# Patient Record
Sex: FEMALE | Race: WHITE | Employment: OTHER | URBAN - METROPOLITAN AREA
[De-identification: names, ages, dates, MRNs, and addresses within clinical notes are randomized per-mention and may not be internally consistent; named-entity substitution may affect disease eponyms.]

---

## 2017-03-17 NOTE — PATIENT DISCUSSION
POAG, OU: INTRAOCULAR PRESSURE IS WITHIN ACCEPTABLE LIMITS. PT INSTRUCTED TO CONTINUE travatan z_ AND RETURN FOR FOLLOW-UP AS SCHEDULED.

## 2017-06-12 NOTE — PATIENT DISCUSSION
DISCUSSED A REFRACTIVE LENS EXCHANGE UNTIL PT IS VISUALLY SIGNIFICANT ENOUGH FOR CATARACT SURGERY. WILL NOT DO LENSX ON PATIENT BECAUSE OF PREVIOUS RK AND LASIK OU. RECOMMEND ORA. DISCUSSED STANDARD AND SYMFONY LENSES.

## 2017-09-19 NOTE — PATIENT DISCUSSION
POAG, OU: INTRAOCULAR PRESSURE IS WITHIN ACCEPTABLE LIMITS. PT INSTRUCTED TO CONTINUE TRAVATAN Z AND RETURN FOR FOLLOW-UP AS SCHEDULED.

## 2017-11-06 ENCOUNTER — NEW PATIENT COMPREHENSIVE (OUTPATIENT)
Dept: URBAN - METROPOLITAN AREA CLINIC 39 | Facility: CLINIC | Age: 77
End: 2017-11-06

## 2017-11-06 DIAGNOSIS — Z96.1: ICD-10-CM

## 2017-11-06 DIAGNOSIS — H26.492: ICD-10-CM

## 2017-11-06 PROCEDURE — 99204 OFFICE O/P NEW MOD 45 MIN: CPT

## 2017-11-06 PROCEDURE — 92015 DETERMINE REFRACTIVE STATE: CPT

## 2017-11-06 PROCEDURE — G8756 NO BP MEASURE DOC: HCPCS

## 2017-11-06 PROCEDURE — 1036F TOBACCO NON-USER: CPT

## 2017-11-06 PROCEDURE — G8427 DOCREV CUR MEDS BY ELIG CLIN: HCPCS

## 2017-11-06 PROCEDURE — 4040F PNEUMOC VAC/ADMIN/RCVD: CPT

## 2017-11-06 ASSESSMENT — VISUAL ACUITY
OD_CC: J2
OS_CC: 20/40+2
OD_SC: J3
OS_SC: 20/50-1
OS_CC: J3
OS_SC: J7
OD_SC: 20/40-2
OD_CC: 20/25-1

## 2017-11-06 ASSESSMENT — TONOMETRY
OD_IOP_MMHG: 15
OS_IOP_MMHG: 16

## 2017-12-06 ENCOUNTER — SURGERY/PROCEDURE (OUTPATIENT)
Dept: URBAN - METROPOLITAN AREA SURGERY 14 | Facility: SURGERY | Age: 77
End: 2017-12-06

## 2017-12-06 ENCOUNTER — ESTABLISHED PATIENT (OUTPATIENT)
Dept: URBAN - METROPOLITAN AREA CLINIC 39 | Facility: CLINIC | Age: 77
End: 2017-12-06

## 2017-12-06 VITALS
SYSTOLIC BLOOD PRESSURE: 145 MMHG | RESPIRATION RATE: 14 BRPM | DIASTOLIC BLOOD PRESSURE: 84 MMHG | HEIGHT: 55 IN | HEART RATE: 70 BPM

## 2017-12-06 DIAGNOSIS — H26.492: ICD-10-CM

## 2017-12-06 PROCEDURE — 66821 AFTER CATARACT LASER SURGERY: CPT

## 2017-12-06 PROCEDURE — 1036F TOBACCO NON-USER: CPT

## 2017-12-06 PROCEDURE — 92014 COMPRE OPH EXAM EST PT 1/>: CPT

## 2017-12-06 PROCEDURE — G8428 CUR MEDS NOT DOCUMENT: HCPCS

## 2017-12-06 ASSESSMENT — TONOMETRY
OD_IOP_MMHG: 17
OS_IOP_MMHG: 17

## 2017-12-06 ASSESSMENT — VISUAL ACUITY
OS_SC: 20/50
OS_GLARE: 20/70 WITH MR
OD_SC: 20/40+1

## 2017-12-14 ENCOUNTER — POST-OP (OUTPATIENT)
Dept: URBAN - METROPOLITAN AREA CLINIC 39 | Facility: CLINIC | Age: 77
End: 2017-12-14

## 2017-12-14 DIAGNOSIS — Z98.890: ICD-10-CM

## 2017-12-14 PROCEDURE — 99024 POSTOP FOLLOW-UP VISIT: CPT

## 2017-12-14 ASSESSMENT — VISUAL ACUITY
OD_SC: 20/50
OS_SC: 20/40

## 2017-12-14 ASSESSMENT — TONOMETRY
OD_IOP_MMHG: 17
OS_IOP_MMHG: 16

## 2018-01-19 NOTE — PATIENT DISCUSSION
POAG, OU: INTRAOCULAR PRESSURE IS WITHIN ACCEPTABLE LIMITS. PT INSTRUCTED TO CONTINUE TRAVATAN Z_______________ AND RETURN FOR FOLLOW-UP AS SCHEDULED.

## 2018-04-06 NOTE — PATIENT DISCUSSION
POAG, OU: INTRAOCULAR PRESSURE IS WITHIN ACCEPTABLE LIMITS. PT INSTRUCTED TO CONTINUE latanoprost AND RETURN FOR FOLLOW-UP AS SCHEDULED.

## 2018-04-06 NOTE — PATIENT DISCUSSION
New Prescription: Travatan Z (travoprost): drops: 0.004% 1 drop at bedtime as directed into both eyes 04-

## 2018-04-06 NOTE — PATIENT DISCUSSION
New Prescription: Maxitrol (neomycin-polymyxin-dexameth): ointment: 3.5-10,000-0.1 mg-unit/g-% a small amount at bedtime as directed into affected eye 04-

## 2018-07-13 NOTE — PATIENT DISCUSSION
Continue: Maxitrol (neomycin-polymyxin-dexameth): ointment: 3.5-10,000-0.1 mg-unit/g-% a small amount at bedtime as directed into affected eye 04-

## 2018-07-13 NOTE — PATIENT DISCUSSION
POAG, OU: INTRAOCULAR PRESSURE IS WITHIN ACCEPTABLE LIMITS. PT INSTRUCTED TO CONTINUE TRAVATAN OR LATNOPROST OU QHS AND RETURN FOR FOLLOW-UP AS SCHEDULED.

## 2018-10-19 NOTE — PATIENT DISCUSSION
POAG, OU: INTRAOCULAR PRESSURE IS WITHIN ACCEPTABLE LIMITS. PT INSTRUCTED TO CONTINUE LATANOPROST OU QHS AND RETURN FOR FOLLOW-UP AS SCHEDULED.

## 2018-11-02 NOTE — PATIENT DISCUSSION
BLURRY VISION OU- PATIENT COMPLAINS OF SIGNIFICANT GLARE AND DECREASED VISION OU. REFRACTION TODAY REVEALS NO NEW CHANGE IN RX. WILL GIVE OLD GLASSES RX, NOT MUCH CHANGE. COULD ALSO PROCEED WITH CATARACT SURGERY DUE TO INCREASE IN GLARE OVER THE LAST YEAR SEEMS SIGNIFICANTLY WORSE ESPECIALLY WHILE RAINING AND OVER TIME DECREASE VISION IN THE LEFT EYE. GAVE NEW GLASSES  PRESCRIPTION AND CATARACT INFORMATION TODAY.

## 2019-01-18 NOTE — PATIENT DISCUSSION
POAG, OU: INTRAOCULAR PRESSURE IS WITHIN ACCEPTABLE LIMITS AND MUCH BETTER. PT INSTRUCTED TO CONTINUE TRAVATAN OU QHS AND RETURN FOR FOLLOW-UP AS SCHEDULED.

## 2019-03-19 NOTE — PATIENT DISCUSSION
CATARACTS, OU - NOT VISUALLY SIGNIFICANT. SPECTACLE RX GIVEN, FOLLOW. Review of Systems:  	•	CONSTITUTIONAL - no fever, no diaphoresis, no chills  	•	SKIN - no rash  	•	HEMATOLOGIC - no bleeding, no bruising  	•	EYES - no eye pain, no blurry vision  	•	ENT - no change in hearing, no sore throat, no ear pain or tinnitus  	•	RESPIRATORY - no shortness of breath, no cough  	•	CARDIAC - no chest pain, no palpitations  	•	GI - no abd pain, no nausea, no vomiting, no diarrhea, no constipation  	•	GENITO-URINARY - no discharge, no dysuria; no hematuria, no increased urinary frequency  	•	MUSCULOSKELETAL - no joint paint, no swelling, no redness  	•	NEUROLOGIC - no weakness, + headache, no paresthesias, no LOC  	•	PSYCH - no anxiety, non suicidal, non homicidal, no hallucination, no depression

## 2019-07-18 ENCOUNTER — ESTABLISHED COMPREHENSIVE EXAM (OUTPATIENT)
Dept: URBAN - METROPOLITAN AREA CLINIC 39 | Facility: CLINIC | Age: 79
End: 2019-07-18

## 2019-07-18 DIAGNOSIS — H11.9: ICD-10-CM

## 2019-07-18 DIAGNOSIS — Z96.1: ICD-10-CM

## 2019-07-18 DIAGNOSIS — C44.1122: ICD-10-CM

## 2019-07-18 PROCEDURE — 92015 DETERMINE REFRACTIVE STATE: CPT

## 2019-07-18 PROCEDURE — 92014 COMPRE OPH EXAM EST PT 1/>: CPT

## 2019-07-18 ASSESSMENT — VISUAL ACUITY
OS_CC: J3
OS_SC: 20/40-1
OD_CC: 20/30-1
OS_SC: J4-
OD_CC: J2
OS_CC: 20/25-1
OD_SC: 20/30-1
OD_SC: J6

## 2019-07-18 ASSESSMENT — TONOMETRY
OD_IOP_MMHG: 16
OS_IOP_MMHG: 17

## 2019-11-25 NOTE — PATIENT DISCUSSION
11/25/2019OS+1.75+1.5050+[id number]/30 -&nbsp; &nbsp; &nbsp; [facility] [place]

## 2020-02-28 NOTE — PATIENT DISCUSSION
INSTILL 1 DROP INTO BOTH EYES AT BEDTIME INSTILL 1 DROP IN [facility] EYE AT BEDTIME

## 2020-03-03 NOTE — PATIENT DISCUSSION
INSTILL 1 DROP INTO BOTH EYES AT BEDTIME INSTILL 1 DROP IN [facility] EYE AT BEDTIME

## 2020-03-20 NOTE — PATIENT DISCUSSION
New Prescription: timolol maleate (timolol maleate): drops: 0.5% 1 drop every morning into both eyes 03-

## 2020-03-20 NOTE — PATIENT DISCUSSION
POAG OU: VISUAL FIELD SHOWED SUPERIOR/INFERIOR/NASAL STEP DEFECTS OU, SHOWS A LOT OF PROGRESSION FROM A YEAR AGO. DISCUSSED RE-PEATING SLT AND ADDING ANOTHER DROP. WILL SCHEDULE SLT OD THEN OS.   CONTINUE TRAVATAN OU QHS AND WILL PRESCRIBE TIMOLOL OU QAM

## 2020-05-01 NOTE — PATIENT DISCUSSION
New Prescription: prednisolone acetate (prednisolone acetate): drops,suspension: 1% 1 drop four times a day as directed into affected eye 05-

## 2020-05-15 NOTE — PATIENT DISCUSSION
INSTILL 1 DROP INTO BOTH EYES AT BEDTIME INSTILL 1 DROP IN [facility] EYE AT BEDTIME

## 2020-09-04 NOTE — PATIENT DISCUSSION
POAG, OU: INTRAOCULAR PRESSURE IS WITHIN ACCEPTABLE LIMITS. PT INSTRUCTED TO CONTINUE DROPS  AND RETURN FOR FOLLOW-UP AS SCHEDULED.

## 2021-01-14 ENCOUNTER — ESTABLISHED COMPREHENSIVE EXAM (OUTPATIENT)
Dept: URBAN - METROPOLITAN AREA CLINIC 39 | Facility: CLINIC | Age: 81
End: 2021-01-14

## 2021-01-14 DIAGNOSIS — H11.9: ICD-10-CM

## 2021-01-14 DIAGNOSIS — H52.203: ICD-10-CM

## 2021-01-14 DIAGNOSIS — H52.4: ICD-10-CM

## 2021-01-14 DIAGNOSIS — H52.13: ICD-10-CM

## 2021-01-14 PROCEDURE — 92015 DETERMINE REFRACTIVE STATE: CPT

## 2021-01-14 PROCEDURE — 92014 COMPRE OPH EXAM EST PT 1/>: CPT

## 2021-01-14 ASSESSMENT — VISUAL ACUITY
OS_SC: J3
OD_SC: J4
OD_CC: 20/20
OS_SC: 20/40
OS_CC: J2
OD_SC: 20/40
OS_CC: 20/20-1
OD_CC: J1

## 2021-01-14 ASSESSMENT — TONOMETRY
OS_IOP_MMHG: 16
OD_IOP_MMHG: 16

## 2021-03-19 NOTE — PATIENT DISCUSSION
POAG, OU: INTRAOCULAR PRESSURE IS WITHIN ACCEPTABLE LIMITS. PT INSTRUCTED TO CONTINUE TIMOLOL OU QAM AND TRAVATAN OU QHS AND RETURN FOR FOLLOW-UP AS SCHEDULED.

## 2021-06-18 NOTE — PATIENT DISCUSSION
INSTILL 1 DROP INTO BOTH EYES AT BEDTIME INSTILL 1 DROP IN [facility] EYE AT BEDTIME

## 2021-09-20 NOTE — PATIENT DISCUSSION
POAG, OU: INTRAOCULAR PRESSURE IS WITHIN ACCEPTABLE LIMITS. PT INSTRUCTED TO CONTINUE TIMOLOL OU QAM AND TRAVTAN OU QHS  AND RETURN FOR FOLLOW-UP AS SCHEDULED.

## 2021-12-20 NOTE — PATIENT DISCUSSION
INTRAOCULAR PRESSURE IS WITHIN ACCEPTABLE LIMITS. PT INSTRUCTED TO CONTINUE TIMOLOL OU QAM AND TRAVTAN OU QHS AND RETURN FOR FOLLOW-UP AS SCHEDULED. GONIO DONE TODAY TO EVALUATE ANGLES.  ANGLES OPEN TODAY.

## 2022-01-20 ENCOUNTER — COMPREHENSIVE EXAM (OUTPATIENT)
Dept: URBAN - METROPOLITAN AREA CLINIC 39 | Facility: CLINIC | Age: 82
End: 2022-01-20

## 2022-01-20 DIAGNOSIS — H11.9: ICD-10-CM

## 2022-01-20 DIAGNOSIS — H52.203: ICD-10-CM

## 2022-01-20 PROCEDURE — 92015 DETERMINE REFRACTIVE STATE: CPT

## 2022-01-20 PROCEDURE — 92014 COMPRE OPH EXAM EST PT 1/>: CPT

## 2022-01-20 ASSESSMENT — VISUAL ACUITY
OS_SC: J8-
OS_SC: 20/30-1
OD_SC: 20/30-1
OD_SC: J6

## 2022-01-20 ASSESSMENT — TONOMETRY
OS_IOP_MMHG: 16
OD_IOP_MMHG: 16

## 2022-04-04 NOTE — PATIENT DISCUSSION
VISUAL FIELD SHOWS  WNL OD AND INCREASE PROGRESSION OF VISION LOSS SHOWING SUPERIOR AND INFERIOR NASAL STEP DEFECTS.  INTRAOCULAR PRESSURE IS WITHIN ACCEPTABLE LIMITS. PT INSTRUCTED TO CONTINUE TIMOLOL OU QAM AND TRAVTAN OU QHS AND WILL SCHEDULE SLT OS THEN OD.  RETURN FOR FOLLOW-UP AS SCHEDULED.

## 2022-05-13 NOTE — PROCEDURE NOTE: CLINICAL
PROCEDURE NOTE: SLT OS. Diagnosis: POAG, Moderate. Anesthesia: Topical. Prep: Betadine Flush. Prior to treatment, the risks/benefits/alternatives were discussed. The patient wished to proceed with procedure. Lens:  SLT laser lens with goniosol. Power: 1mJ. Total applications: [phone]. Application [phone] Degrees. Patient tolerated procedure well. There were no complications. Post-op instructions given. Hasmukh Rowan

## 2022-06-10 NOTE — PROCEDURE NOTE: CLINICAL
PROCEDURE NOTE: SLT OD. Diagnosis: POAG, Moderate. Anesthesia: Topical. Prep: Betadine Flush. Prior to treatment, the risks/benefits/alternatives were discussed. The patient wished to proceed with procedure. Lens:  SLT laser lens with goniosol. Power: 1mJ. Total applications: 127. Application [phone] Degrees. Patient tolerated procedure well. There were no complications. Post-op instructions given. Edward Bunch

## 2023-01-20 ENCOUNTER — COMPREHENSIVE EXAM (OUTPATIENT)
Dept: URBAN - METROPOLITAN AREA CLINIC 38 | Facility: CLINIC | Age: 83
End: 2023-01-20

## 2023-01-20 DIAGNOSIS — H11.9: ICD-10-CM

## 2023-01-20 DIAGNOSIS — H52.203: ICD-10-CM

## 2023-01-20 DIAGNOSIS — Z96.1: ICD-10-CM

## 2023-01-20 DIAGNOSIS — H43.813: ICD-10-CM

## 2023-01-20 PROCEDURE — 92015 DETERMINE REFRACTIVE STATE: CPT

## 2023-01-20 PROCEDURE — 92014 COMPRE OPH EXAM EST PT 1/>: CPT

## 2023-01-20 ASSESSMENT — VISUAL ACUITY
OU_CC: J2
OS_CC: J2-
OS_CC: 20/25
OD_CC: 20/25-1
OD_CC: J2
OU_CC: 20/20

## 2023-01-20 ASSESSMENT — TONOMETRY
OD_IOP_MMHG: 16
OS_IOP_MMHG: 19

## 2023-10-12 ENCOUNTER — EMERGENCY VISIT (OUTPATIENT)
Dept: URBAN - METROPOLITAN AREA CLINIC 38 | Facility: CLINIC | Age: 83
End: 2023-10-12

## 2023-10-12 DIAGNOSIS — H50.52: ICD-10-CM

## 2023-10-12 DIAGNOSIS — H11.9: ICD-10-CM

## 2023-10-12 DIAGNOSIS — Z96.1: ICD-10-CM

## 2023-10-12 DIAGNOSIS — H53.2: ICD-10-CM

## 2023-10-12 DIAGNOSIS — H43.813: ICD-10-CM

## 2023-10-12 PROCEDURE — 92012 INTRM OPH EXAM EST PATIENT: CPT

## 2023-10-12 ASSESSMENT — VISUAL ACUITY
OS_SC: 20/20-1 BLURRY
OD_SC: 20/20
OU_SC: 20/20

## 2023-10-12 ASSESSMENT — TONOMETRY
OS_IOP_MMHG: 18
OD_IOP_MMHG: 17

## 2024-02-06 ENCOUNTER — COMPREHENSIVE EXAM (OUTPATIENT)
Dept: URBAN - METROPOLITAN AREA CLINIC 38 | Facility: CLINIC | Age: 84
End: 2024-02-06

## 2024-02-06 DIAGNOSIS — H11.9: ICD-10-CM

## 2024-02-06 DIAGNOSIS — H50.52: ICD-10-CM

## 2024-02-06 DIAGNOSIS — H53.2: ICD-10-CM

## 2024-02-06 DIAGNOSIS — Z96.1: ICD-10-CM

## 2024-02-06 DIAGNOSIS — H43.813: ICD-10-CM

## 2024-02-06 PROCEDURE — 92014 COMPRE OPH EXAM EST PT 1/>: CPT

## 2024-02-06 ASSESSMENT — VISUAL ACUITY
OD_CC: 20/20
OS_CC: J1
OU_CC: 20/20
OU_CC: J1
OS_CC: 20/20-1
OD_CC: J1

## 2024-02-06 ASSESSMENT — TONOMETRY
OD_IOP_MMHG: 17
OS_IOP_MMHG: 18

## 2025-02-10 ENCOUNTER — COMPREHENSIVE EXAM (OUTPATIENT)
Age: 85
End: 2025-02-10

## 2025-02-10 DIAGNOSIS — H52.13: ICD-10-CM

## 2025-02-10 DIAGNOSIS — H52.203: ICD-10-CM

## 2025-02-10 DIAGNOSIS — H11.9: ICD-10-CM

## 2025-02-10 DIAGNOSIS — H43.813: ICD-10-CM

## 2025-02-10 DIAGNOSIS — Z96.1: ICD-10-CM

## 2025-02-10 DIAGNOSIS — H50.52: ICD-10-CM

## 2025-02-10 PROCEDURE — 92015 DETERMINE REFRACTIVE STATE: CPT

## 2025-02-10 PROCEDURE — 92014 COMPRE OPH EXAM EST PT 1/>: CPT
